# Patient Record
Sex: MALE | Race: WHITE | URBAN - METROPOLITAN AREA
[De-identification: names, ages, dates, MRNs, and addresses within clinical notes are randomized per-mention and may not be internally consistent; named-entity substitution may affect disease eponyms.]

---

## 2017-06-07 ENCOUNTER — IMPORTED ENCOUNTER (OUTPATIENT)
Dept: URBAN - METROPOLITAN AREA CLINIC 50 | Facility: CLINIC | Age: 72
End: 2017-06-07

## 2017-06-15 ENCOUNTER — IMPORTED ENCOUNTER (OUTPATIENT)
Dept: URBAN - METROPOLITAN AREA CLINIC 50 | Facility: CLINIC | Age: 72
End: 2017-06-15

## 2017-07-12 ENCOUNTER — IMPORTED ENCOUNTER (OUTPATIENT)
Dept: URBAN - METROPOLITAN AREA CLINIC 50 | Facility: CLINIC | Age: 72
End: 2017-07-12

## 2017-07-24 ENCOUNTER — IMPORTED ENCOUNTER (OUTPATIENT)
Dept: URBAN - METROPOLITAN AREA CLINIC 50 | Facility: CLINIC | Age: 72
End: 2017-07-24

## 2017-08-02 ENCOUNTER — IMPORTED ENCOUNTER (OUTPATIENT)
Dept: URBAN - METROPOLITAN AREA CLINIC 50 | Facility: CLINIC | Age: 72
End: 2017-08-02

## 2017-08-21 ENCOUNTER — IMPORTED ENCOUNTER (OUTPATIENT)
Dept: URBAN - METROPOLITAN AREA CLINIC 50 | Facility: CLINIC | Age: 72
End: 2017-08-21

## 2018-04-02 NOTE — PATIENT DISCUSSION
PER PT, HAS TRIED 3-4 DROPS WITH NO RESPONSE. WE'LL REQUEST RECORDS FROM DR. GUNN. +/- SLT OU ONCE WE REVIEW THE RECORDS.

## 2018-05-16 NOTE — PROCEDURE NOTE: CLINICAL
PROCEDURE NOTE: SLT #1 OS. Diagnosis: POAG, Mild. Anesthesia: Topical. Prep: Alphagan 0.15%. Prior to treatment, risks/benefits/alternatives discussed including infection, loss of vision, hemorrhage, cataract, glaucoma, retinal tears or detachment. Lens:  SLT laser lens with goniosol. Power: 1.2mJ. Total applications: 639. Application 594 degrees. Patient tolerated procedure well. There were no complications. Post-op instructions given. Post-op IOP = 14 mmHg. Talha Shelton

## 2018-06-27 NOTE — PROCEDURE NOTE: CLINICAL
PROCEDURE NOTE: SLT #1 OD. Diagnosis: POAG, Moderate. Anesthesia: Topical. Prep: Alphagan 0.15%. Prior to treatment, risks/benefits/alternatives discussed including infection, loss of vision, hemorrhage, cataract, glaucoma, retinal tears or detachment. Lens:  SLT laser lens with goniosol. Power: 1.2mJ. Total applications: 263. Application 120 degrees. Patient tolerated procedure well. There were no complications. Post-op instructions given. Post-op IOP = 12 mmHg. Talha Shelton

## 2018-12-24 NOTE — PATIENT DISCUSSION
Discussed condition and exacerbating conditions/situations (e.g., dry/arid environments, overhead fans, air conditioners, side effect of medications). Do Not Use Extremity;

## 2019-07-02 ENCOUNTER — IMPORTED ENCOUNTER (OUTPATIENT)
Dept: URBAN - METROPOLITAN AREA CLINIC 50 | Facility: CLINIC | Age: 74
End: 2019-07-02

## 2019-07-24 NOTE — PATIENT DISCUSSION
Discussed use of I-Stent at the time of CE as an added layer of therapy to help reduce IOP. Discussed R/B/Alternatives.

## 2019-08-26 NOTE — PATIENT DISCUSSION
1 week  PO: Patient is doing well post-operatively. The importance of post-op drop compliance was emphasized. Drop schedule reviewed with patient. Patient to call if any visual changes or concerns.

## 2021-04-17 ASSESSMENT — TONOMETRY
OD_IOP_MMHG: 13
OS_IOP_MMHG: 14
OS_IOP_MMHG: 15
OD_IOP_MMHG: 12

## 2021-04-17 ASSESSMENT — VISUAL ACUITY
OD_CC: 20/25-2
OS_CC: CF@2'
OS_CC: 20/400
OD_CC: 20/30
OD_CC: 20/25
OD_CC: 20/40

## 2022-03-30 NOTE — PATIENT DISCUSSION
Forms were faxed back 7/24/21 Patient achieved a 15% reduction in IOP from pretreatment levels or a plan is in place to achieve this goal.